# Patient Record
Sex: FEMALE | Race: WHITE | Employment: OTHER | URBAN - METROPOLITAN AREA
[De-identification: names, ages, dates, MRNs, and addresses within clinical notes are randomized per-mention and may not be internally consistent; named-entity substitution may affect disease eponyms.]

---

## 2018-01-22 RX ORDER — LANOLIN ALCOHOL/MO/W.PET/CERES
500 CREAM (GRAM) TOPICAL NIGHTLY
COMMUNITY

## 2018-01-22 RX ORDER — ESTRADIOL 0.5 MG/1
0.5 TABLET ORAL DAILY
COMMUNITY

## 2018-01-22 RX ORDER — ASPIRIN 81 MG/1
81 TABLET ORAL DAILY
COMMUNITY

## 2018-01-22 RX ORDER — NITROGLYCERIN 0.4 MG/1
0.4 TABLET SUBLINGUAL EVERY 5 MIN PRN
COMMUNITY

## 2018-01-22 RX ORDER — ACETAMINOPHEN 160 MG
2000 TABLET,DISINTEGRATING ORAL
COMMUNITY

## 2018-01-22 RX ORDER — M-VIT,TX,IRON,MINS/CALC/FOLIC 27MG-0.4MG
1 TABLET ORAL DAILY
COMMUNITY

## 2018-01-22 RX ORDER — LANOLIN ALCOHOL/MO/W.PET/CERES
1000 CREAM (GRAM) TOPICAL DAILY
COMMUNITY

## 2018-01-23 ENCOUNTER — HOSPITAL ENCOUNTER (OUTPATIENT)
Dept: CARDIAC CATH/INVASIVE PROCEDURES | Age: 83
Discharge: HOME | End: 2018-01-23
Attending: INTERNAL MEDICINE
Payer: MEDICARE

## 2018-01-23 VITALS
HEART RATE: 106 BPM | OXYGEN SATURATION: 96 % | HEIGHT: 65 IN | BODY MASS INDEX: 26.92 KG/M2 | DIASTOLIC BLOOD PRESSURE: 89 MMHG | WEIGHT: 161.6 LBS | SYSTOLIC BLOOD PRESSURE: 142 MMHG | RESPIRATION RATE: 15 BRPM | TEMPERATURE: 98.4 F

## 2018-01-23 DIAGNOSIS — R55 SYNCOPE AND COLLAPSE: ICD-10-CM

## 2018-01-23 PROCEDURE — 7100000010 HC PHASE II RECOVERY - FIRST 15 MIN

## 2018-01-23 PROCEDURE — 6360000002 HC RX W HCPCS

## 2018-01-23 PROCEDURE — 7100000011 HC PHASE II RECOVERY - ADDTL 15 MIN

## 2018-01-23 PROCEDURE — 2500000003 HC RX 250 WO HCPCS

## 2018-01-23 PROCEDURE — C1764 EVENT RECORDER, CARDIAC: HCPCS

## 2018-01-23 PROCEDURE — 2580000003 HC RX 258: Performed by: INTERNAL MEDICINE

## 2018-01-23 PROCEDURE — 33282 HC IMPANTABLE LOOP RECORDER: CPT | Performed by: INTERNAL MEDICINE

## 2018-01-23 RX ORDER — VANCOMYCIN HYDROCHLORIDE 1 G/200ML
1000 INJECTION, SOLUTION INTRAVENOUS ONCE
Status: DISCONTINUED | OUTPATIENT
Start: 2018-01-23 | End: 2018-01-24 | Stop reason: HOSPADM

## 2018-01-23 RX ORDER — CHLORHEXIDINE GLUCONATE 4 G/100ML
SOLUTION TOPICAL ONCE
Status: DISCONTINUED | OUTPATIENT
Start: 2018-01-23 | End: 2018-01-24 | Stop reason: HOSPADM

## 2018-01-23 RX ORDER — SODIUM CHLORIDE 9 MG/ML
INJECTION, SOLUTION INTRAVENOUS CONTINUOUS
Status: DISCONTINUED | OUTPATIENT
Start: 2018-01-23 | End: 2018-01-24 | Stop reason: HOSPADM

## 2018-01-23 RX ADMIN — SODIUM CHLORIDE: 9 INJECTION, SOLUTION INTRAVENOUS at 08:27

## 2018-01-23 ASSESSMENT — PAIN SCALES - GENERAL
PAINLEVEL_OUTOF10: 0

## 2018-01-23 ASSESSMENT — PAIN - FUNCTIONAL ASSESSMENT: PAIN_FUNCTIONAL_ASSESSMENT: 0-10

## 2018-01-23 NOTE — OP NOTE
Operative report:    Procedure: Implantation of a loop recorder. Preoperative diagnosis: Recurrent syncope    Postop diagnosis:LOOP RECORDER IMPLANT. Waldemar Meza is a 70-year-old female who has had recurrent episodes of near syncope and syncope. Precordial implantation was recommended to evaluate for her symptoms of syncope and near syncope and a therapy. Technique of the procedure: After obtaining informed consent patient was brought to the EP lab where she was prepped and draped in the usual fashion 1% Xylocaine was infiltrated in the left parasternal region after IV sedation with fentanyl and Versed. A [employer] loop recorder link LINQ model L N Q11 serial R [id number] was implanted in the left parasternal region without difficulty. QRS amplitude sensing was excellent. Routine postoperative care instituted.     Complications none    Estimated blood loss: Less than 1 mL

## 2018-01-23 NOTE — PRE SEDATION
Sedation Pre-Procedure Note    Patient Name: Javed Ashley   YOB: 1929  Room/Bed: Room/bed info not found  Medical Record Number: [id number]  Date: 1/23/2018   Time: 8:36 AM       Indication: loop recorder implat  Consent: I have discussed with the patient and/or the patient representative the indication, alternatives, and the possible risks and/or complications of the planned procedure and the anesthesia methods. The patient and/or patient representative appear to understand and agree to proceed. Vital Signs:   Vitals:    01/23/18 0835   BP: (!) 175/82   Pulse:    Resp:    Temp:    SpO2:        Past Medical History:   has a past medical history of Angina pectoris ([facility] [address] Asthma; CAD (coronary artery disease); Heart attack; Hyperlipidemia; Hypertension; Osteoarthritis (arthritis due to wear and tear of joints); and Spinal stenosis. Past Surgical History:   has a past surgical history that includes Cholecystectomy; Total shoulder arthroplasty; Total knee arthroplasty (Right); Cardiac catheterization (02/14/2014); hernia repair; joint replacement; and Tonsillectomy. Medications:   Scheduled Meds:    vancomycin  1,000 mg Intravenous Once    chlorhexidine   Topical Once     Continuous Infusions:    sodium chloride 50 mL/hr at 01/23/18 0827     PRN Meds:   Home Meds:   Prior to Admission medications    Medication Sig Start Date End Date Taking?  Authorizing Provider   aspirin 81 MG EC tablet Take 81 mg by mouth daily   Yes Historical Provider, MD   estradiol (ESTRACE) 0.5 MG tablet Take 0.5 mg by mouth daily   Yes Historical Provider, MD   Multiple Vitamins-Minerals (THERAPEUTIC MULTIVITAMIN-MINERALS) tablet Take 1 tablet by mouth daily   Yes Historical Provider, MD   niacin (SLO-NIACIN) 500 MG extended release tablet Take 500 mg by mouth nightly   Yes Historical Provider, MD   Cholecalciferol (VITAMIN D3) 2000 units CAPS Take 2,000 Units by mouth daily (with breakfast)   Yes Historical Provider, MD vitamin B-12 (CYANOCOBALAMIN) 1000 MCG tablet Take 1,000 mcg by mouth daily   Yes Historical Provider, MD   montelukast (SINGULAIR) 10 MG tablet Take 10 mg by mouth nightly. Yes Historical Provider, MD   isosorbide mononitrate (IMDUR) 30 MG CR tablet Take 30 mg by mouth daily. Yes Historical Provider, MD   Pantoprazole Sodium (PROTONIX) 40 MG PACK packet Take 40 mg by mouth daily. Yes Historical Provider, MD   meloxicam (MOBIC) 15 MG tablet Take 15 mg by mouth daily. Yes Historical Provider, MD   Budesonide-Formoterol Fumarate (SYMBICORT IN) Inhale  into the lungs. Yes Historical Provider, MD   traMADol (ULTRAM) 50 MG tablet Take 50 mg by mouth every 6 hours as needed for Pain. Yes Historical Provider, MD   theophylline (MATTHEW-24) 200 MG SR capsule Take 200 mg by mouth daily. Yes Historical Provider, MD   Calcium Carbonate-Vitamin D (CALTRATE COLON HEALTH PO) Take  by mouth. Yes Historical Provider, MD   nitroGLYCERIN (NITROSTAT) 0.4 MG SL tablet Place 0.4 mg under the tongue every 5 minutes as needed for Chest pain up to max of 3 total doses. If no relief after 1 dose, call 911. Historical Provider, MD   clopidogrel (PLAVIX) 75 MG tablet Take 75 mg by mouth daily. Historical Provider, MD   spironolactone (ALDACTONE) 25 MG tablet Take 25 mg by mouth daily. Historical Provider, MD   norethindrone (AYGESTIN) 5 MG tablet Take 5 mg by mouth daily. Historical Provider, MD   COD LIVER OIL PO Take  by mouth. Historical Provider, MD   calcium carbonate (OSCAL) 500 MG TABS tablet Take 500 mg by mouth daily. Historical Provider, MD   Biotin 1 MG CAPS Take  by mouth. Historical Provider, MD   MAGNESIUM PO Take  by mouth.     Historical Provider, MD     Coumadin Use Last 7 Days:  no  Antiplatelet drug therapy use last 7 days: yes   Other anticoagulant use last 7 days: no  Additional Medication Information:        Pre-Sedation Documentation and Exam:   I have personally completed a history,

## 2018-01-23 NOTE — POST SEDATION
Sedation Post Procedure Note    Patient Name: Yamil Camarillo   YOB: 1929  Room/Bed: Room/bed info not found  Medical Record Number: [id number]  Date: 1/23/2018   Time: 9:26 AM         Physicians/Assistants: Jenna Ross MD    Procedure Performed:  LOOP RECORDER IMPLANTATION    Post-Sedation Vital Signs:  Vitals:    01/23/18 0835   BP: (!) 175/82   Pulse:    Resp:    Temp:    SpO2:       Vital signs were reviewed and were stable after the procedure (see flow sheet for vitals)            Post-Sedation Exam: Lungs: clear           Complications: none    Electronically signed by Jenna Ross MD on 1/23/2018 at 9:26 AM

## 2018-01-23 NOTE — BRIEF OP NOTE
Brief Postoperative Note  ______________________________________________________________    Patient: Keith Belcher  YOB: 1929  MRN: [id number]  Date of Procedure: 1/23/2018    Pre-Op Diagnosis: Syncope    Post-Op Diagnosis: Successful implantation of loop recorder           Anesthesia: 1% Xylocaine local infiltration, IV sedation with 1 mg of Versed and 50 µg of fentanyl    Surgeon: Hellen Chopra MD.      Estimated Blood Loss: less than 1 ml. Complications:none.     Implants:  [employer]  Loop recorder REVEAL LINQ [id number], SERIAL [id number]            Findings: Successful implantation of loop recorder with excellent QRS amplitude sensing    Cher Lebron MD  Date: 1/23/2018  Time: 9:22 AM

## 2021-01-27 ENCOUNTER — HOSPITAL ENCOUNTER (OUTPATIENT)
Age: 86
Setting detail: SPECIMEN
Discharge: HOME | End: 2021-01-27
Payer: MEDICARE

## 2021-01-27 LAB
-: ABNORMAL
AMORPHOUS: ABNORMAL
BACTERIA: ABNORMAL
BILIRUBIN URINE: NEGATIVE
CASTS UA: ABNORMAL /LPF (ref 0–2)
CASTS UA: ABNORMAL /LPF (ref 0–2)
COLOR: ABNORMAL
COMMENT UA: ABNORMAL
CRYSTALS, UA: ABNORMAL /HPF
CRYSTALS, UA: ABNORMAL /HPF
EPITHELIAL CELLS UA: ABNORMAL /HPF (ref 0–5)
GLUCOSE URINE: NEGATIVE
KETONES, URINE: ABNORMAL
LEUKOCYTE ESTERASE, URINE: ABNORMAL
MUCUS: ABNORMAL
NITRITE, URINE: POSITIVE
OTHER OBSERVATIONS UA: ABNORMAL
PH UA: 5 (ref 5–8)
PROTEIN UA: ABNORMAL
RBC UA: ABNORMAL /HPF (ref 0–2)
RENAL EPITHELIAL, UA: ABNORMAL /HPF
SPECIFIC GRAVITY UA: 1.02 (ref 1–1.03)
TRICHOMONAS: ABNORMAL
TURBIDITY: ABNORMAL
URINE HGB: ABNORMAL
UROBILINOGEN, URINE: NORMAL
WBC UA: ABNORMAL /HPF (ref 0–5)
YEAST: ABNORMAL

## 2021-01-27 PROCEDURE — 81001 URINALYSIS AUTO W/SCOPE: CPT

## 2021-01-27 PROCEDURE — 87077 CULTURE AEROBIC IDENTIFY: CPT

## 2021-01-27 PROCEDURE — 87086 URINE CULTURE/COLONY COUNT: CPT

## 2021-01-27 PROCEDURE — 87186 SC STD MICRODIL/AGAR DIL: CPT

## 2021-01-30 LAB
CULTURE: ABNORMAL
CULTURE: ABNORMAL
Lab: ABNORMAL
SPECIMEN DESCRIPTION: ABNORMAL